# Patient Record
Sex: FEMALE | Race: WHITE | NOT HISPANIC OR LATINO
[De-identification: names, ages, dates, MRNs, and addresses within clinical notes are randomized per-mention and may not be internally consistent; named-entity substitution may affect disease eponyms.]

---

## 2020-12-05 ENCOUNTER — TRANSCRIPTION ENCOUNTER (OUTPATIENT)
Age: 65
End: 2020-12-05

## 2020-12-18 ENCOUNTER — TRANSCRIPTION ENCOUNTER (OUTPATIENT)
Age: 65
End: 2020-12-18

## 2021-06-18 ENCOUNTER — APPOINTMENT (OUTPATIENT)
Dept: NEUROLOGY | Facility: CLINIC | Age: 66
End: 2021-06-18
Payer: COMMERCIAL

## 2021-06-18 VITALS
HEIGHT: 60 IN | SYSTOLIC BLOOD PRESSURE: 124 MMHG | WEIGHT: 120 LBS | HEART RATE: 72 BPM | DIASTOLIC BLOOD PRESSURE: 80 MMHG | BODY MASS INDEX: 23.56 KG/M2 | TEMPERATURE: 97.2 F

## 2021-06-18 PROCEDURE — 99072 ADDL SUPL MATRL&STAF TM PHE: CPT

## 2021-06-18 PROCEDURE — 99203 OFFICE O/P NEW LOW 30 MIN: CPT

## 2021-06-18 RX ORDER — ASPIRIN 81 MG
81 TABLET, DELAYED RELEASE (ENTERIC COATED) ORAL
Refills: 0 | Status: ACTIVE | COMMUNITY

## 2021-06-21 NOTE — PHYSICAL EXAM
[___ / 5] : Visuospatial / Executive: [unfilled] / 5 [___ / 3] : Attention (Serial 7 subtraction): [unfilled] / 3 [___ / 1] : Fluency: [unfilled] / 1 [___ / 2] : Abstraction: [unfilled] / 2 [___ / 5] : Delayed Recall: [unfilled] / 5 [___ / 6] : Orientation: [unfilled] / 6 [FreeTextEntry1] : Physical examination \par General: No acute distress, Awake, Alert.   \par \par Mental status \par Awake, alert, gives detailed history. \par 6/18/21 MoCA 26/30, memory 1/5.\par \par Cranial Nerves \par II: VFF  \par III, IV, VI: PERRL, EOMI.   \par V: Facial sensation is normal B/L.   \par VII: Facial strength is normal B/L. \par \par \par VIII: Gross hearing is intact.   \par \par \par IX, X: Palate is midline and elevates symmetrically.   \par XI: Trapezius normal strength.   \par XII: Tongue midline without atrophy or fasciculations. \par \par Motor exam  \par Muscle tone - no evidence of rigidity or resistance in all 4 extremities.  \par No atrophy or fasciculations \par Muscle Strength: arms and legs, proximal and distal flexors and extensors are normal \par \par No UE drift.\par \par Reflexes \par All present, normal, and symmetrical.   \par \par Plantars right: mute.   \par Plantars left: mute.   \par \par  \par \par Coordination \par Finger to nose: Normal.  \par Heel to shin: Normal.   \par  \par \par Sensory \par Intact sensation to vibration and cold. \par \par \par Gait \par Normal including heels, toes, and tandem gait.  \par  \par  [[employer]] : 26

## 2021-06-21 NOTE — ASSESSMENT
[FreeTextEntry1] : Natalie Strauss is a 65 year old woman with memory impairment with no change in her daily function.\par \par Vitamin B12 level\par TSH\par MRI brain\par Neuropsychological evaluation. \par Follow up in 2 months.\par \par I discussed in detail with the patient and family the diagnosis, prognosis, treatment plan and answered all of their questions.\par \par  \par

## 2021-06-21 NOTE — CONSULT LETTER
[Dear  ___] : Dear  [unfilled], [FreeTextEntry1] : I had the pleasure of evaluating your patient, MADELIN MONTAGUE. Please see the assessment section below for a summary of my diagnostic impression and plan.\par \par Thank you very much for allowing me to participate in the care of this patient. If you have any questions, please do not hesitate to contact me. \par \par Sincerely,\par \par Caroline Pool MD\par Michelle Ny N.P.\par

## 2021-06-21 NOTE — HISTORY OF PRESENT ILLNESS
[FreeTextEntry1] : History obtained from patient and . \par \par Natalie Strauss is a 65 year old woman with a history of vasovagal syncope presenting for a consultation for memory loss.\par \par She endorses a slow, subtle, progressive memory loss over one year.  She is able to perform all of her ADL's without difficulty. Her  did not need to take over any tasks at home. Ms. Strauss endorses she is more forgetful when she is stressed. Her  notes that he will often have to repeat tasks to her (i.e. a delivery was made and she forgot that it occurred). She reports being able to recall after her  reminds her of what happened. \par \par She is aware of her memory loss. \par \par The remaining neurological review of systems is negative.

## 2021-06-24 ENCOUNTER — RESULT REVIEW (OUTPATIENT)
Age: 66
End: 2021-06-24

## 2021-06-25 ENCOUNTER — NON-APPOINTMENT (OUTPATIENT)
Age: 66
End: 2021-06-25

## 2021-06-25 LAB
TSH SERPL-ACNC: 1.65 UIU/ML
VIT B12 SERPL-MCNC: 977 PG/ML

## 2021-07-28 NOTE — PHYSICAL EXAM
[Well Developed] : well developed [Well Nourished] : well nourished [No Acute Distress] : no acute distress [Normal Conjunctiva] : normal conjunctiva [Normal Venous Pressure] : normal venous pressure [Normal S1, S2] : normal S1, S2 [No Carotid Bruit] : no carotid bruit [No Murmur] : no murmur [No Rub] : no rub [No Gallop] : no gallop [Clear Lung Fields] : clear lung fields [Good Air Entry] : good air entry [No Respiratory Distress] : no respiratory distress  [Soft] : abdomen soft [Non Tender] : non-tender [Normal Bowel Sounds] : normal bowel sounds [No Masses/organomegaly] : no masses/organomegaly [Normal Gait] : normal gait [No Edema] : no edema [No Cyanosis] : no cyanosis [No Clubbing] : no clubbing [No Varicosities] : no varicosities [No Rash] : no rash [No Skin Lesions] : no skin lesions [Moves all extremities] : moves all extremities [No Focal Deficits] : no focal deficits [Normal Speech] : normal speech [Alert and Oriented] : alert and oriented [Normal memory] : normal memory

## 2021-08-04 ENCOUNTER — NON-APPOINTMENT (OUTPATIENT)
Age: 66
End: 2021-08-04

## 2021-08-04 ENCOUNTER — APPOINTMENT (OUTPATIENT)
Dept: CARDIOLOGY | Facility: CLINIC | Age: 66
End: 2021-08-04
Payer: COMMERCIAL

## 2021-08-04 VITALS
HEIGHT: 60 IN | SYSTOLIC BLOOD PRESSURE: 112 MMHG | WEIGHT: 127 LBS | BODY MASS INDEX: 24.94 KG/M2 | DIASTOLIC BLOOD PRESSURE: 70 MMHG | TEMPERATURE: 98.6 F | HEART RATE: 84 BPM

## 2021-08-04 DIAGNOSIS — R55 SYNCOPE AND COLLAPSE: ICD-10-CM

## 2021-08-04 DIAGNOSIS — Z00.00 ENCOUNTER FOR GENERAL ADULT MEDICAL EXAMINATION W/OUT ABNORMAL FINDINGS: ICD-10-CM

## 2021-08-04 DIAGNOSIS — I95.1 ORTHOSTATIC HYPOTENSION: ICD-10-CM

## 2021-08-04 PROCEDURE — 93000 ELECTROCARDIOGRAM COMPLETE: CPT

## 2021-08-04 PROCEDURE — 99204 OFFICE O/P NEW MOD 45 MIN: CPT

## 2021-08-04 NOTE — HISTORY OF PRESENT ILLNESS
[FreeTextEntry1] : The patient is a 65 year-old woman referred because of recurrent syncope and near-syncope.\par \par She has had this history for many years, for at least 20 by her recollection. Her , who was present throughout, confirms this. Episodes are infrequent, but are very consistently associated with getting out of bed or sitting up from a chair. At these times, she feels a dizzy sensation and has an aura that she might faint. Over the years she has learned to live with this, quickly sitting down and avoiding syncope. However, in the past, if she did not sit down quickly, she lost consciousness. On only one occasion did she injure herself and required some sutures in her lip. Otherwise there is no history of trauma associated with these episodes.\par \par Her past medical history is otherwise unremarkable.\par There is no history of hypertension or diabetes.\par There is no history of heart murmur or rheumatic fever.\par She is a former one pack per day smoker who quit 10 years ago and was been vaping since that time.\par \par There are no cardiovascular symptoms.\par She denies chest pain or chest pressure. No shortness of breath or dyspnea on exertion.\par \par Additional information:\par She is currently seeing neurology and undergoing investigation for early-onset memory loss and cognitive dysfunction. She had a recent MRI of the brain which suggested microvascular changes.\par \par The patient is  and lives locally.\par She has adult children.\par \par EKG today reveals normal sinus rhythm and is within normal limits\par \par \par

## 2021-08-04 NOTE — ASSESSMENT
[FreeTextEntry1] : #1) long and many year history of recurrent dizziness including syncope. By history, this is clearly orthostatic although I could not demonstrate this today on exam. The history of standing up and becoming lightheaded and aborting many episodes by simply sitting or laying down is classic. She has never had an episode of syncope in which he did not follow a properly standing up from a sitting or supine position.\par --We had a lengthy discussion concerning the role of tilt table testing to confirm this. However, with her ongoing evaluation for memory loss and her reluctance to pursue this, we will defer tilt table testing. She expressed that she has lived with this for many years and is comfortable continuing to do so. She feels that if she sits down and avoid situations that she knows will precipitate dizziness, she can avoid these episodes. She is also reluctant to take any medications, and we confirm the diagnosis at tilt table testing, hence there appears to be little reason to proceed.\par #2) obvious cognitive dysfunction and memory loss, per neurology

## 2021-08-23 ENCOUNTER — APPOINTMENT (OUTPATIENT)
Dept: NEUROLOGY | Facility: CLINIC | Age: 66
End: 2021-08-23
Payer: COMMERCIAL

## 2021-08-23 VITALS
SYSTOLIC BLOOD PRESSURE: 113 MMHG | HEART RATE: 74 BPM | BODY MASS INDEX: 24.94 KG/M2 | DIASTOLIC BLOOD PRESSURE: 76 MMHG | TEMPERATURE: 97.7 F | WEIGHT: 127 LBS | HEIGHT: 60 IN

## 2021-08-23 DIAGNOSIS — R41.3 OTHER AMNESIA: ICD-10-CM

## 2021-08-23 PROCEDURE — 99213 OFFICE O/P EST LOW 20 MIN: CPT

## 2021-08-23 NOTE — ASSESSMENT
[FreeTextEntry1] : Natalie Strauss is a 65 year old woman with cognitive impairment with no change in her daily function.\par Some of the impairments may be related to attention.\par \par MRI Brain and blood tests reviewed. \par Neuropsychological evaluation -will consider prescribing medication after this has been completed. \par \par Follow up in 2 months.\par \par I discussed in detail with the patient and  the diagnosis, prognosis, treatment plan and answered all of their questions.\par \par

## 2021-08-23 NOTE — HISTORY OF PRESENT ILLNESS
[FreeTextEntry1] : No change in cognition.  No new c/o.\par Unable to find anyone to do a neuropsychological evaluation yet.

## 2021-12-07 ENCOUNTER — APPOINTMENT (OUTPATIENT)
Dept: NEUROLOGY | Facility: CLINIC | Age: 66
End: 2021-12-07

## 2024-03-01 ENCOUNTER — NON-APPOINTMENT (OUTPATIENT)
Age: 69
End: 2024-03-01

## 2024-03-05 ENCOUNTER — NON-APPOINTMENT (OUTPATIENT)
Age: 69
End: 2024-03-05

## 2024-03-05 ENCOUNTER — APPOINTMENT (OUTPATIENT)
Dept: THORACIC SURGERY | Facility: CLINIC | Age: 69
End: 2024-03-05
Payer: MEDICARE

## 2024-03-05 VITALS — WEIGHT: 122 LBS | BODY MASS INDEX: 23.95 KG/M2 | HEIGHT: 60 IN

## 2024-03-05 DIAGNOSIS — Z87.891 PERSONAL HISTORY OF NICOTINE DEPENDENCE: ICD-10-CM

## 2024-03-05 PROCEDURE — G0296 VISIT TO DETERM LDCT ELIG: CPT

## 2024-03-05 NOTE — PLAN
[Smoking Cessation] : smoking cessation [FreeTextEntry1] : Plan:  -Low dose CT chest for lung cancer screening. PETE SARAHDARLENE ordered the low dose CT.      -Follow up with  her referring provider after her LDCT results have been reviewed by the multidisciplinary clinical team, if needed.   -Encourage continued smoking abstinence.    Should I screen? tool utilized. 6 Year risk of lung cancer is  1.8 %.   Patient wishes to proceed with screening.  Engaged in discussion regarding risks of screening during Covid-19 pandemic and precautions that are being used  to reduce exposure.  Engaged in shared decision making with Ms. MONTAGUE . Answered all questions. She verbalized understanding and agreement. She knows to call back with and questions or concerns.

## 2024-03-05 NOTE — REASON FOR VISIT
[Home] : at home, [unfilled] , at the time of the visit. [Verbal consent obtained from patient] : the patient, [unfilled] [Other Location: e.g. Home (Enter Location, City,State)___] : at [unfilled] [Initial Evaluation] : an initial evaluation visit [Review of Eligibility] : review of eligibility [Low-Dose CT Screening Discussion] : low-dose CT lung cancer screening discussion

## 2024-03-05 NOTE — HISTORY OF PRESENT ILLNESS
[Former] : Former [YearQuit] : 2016 [TextBox_13] : Referred by Dr. Broderick   MADELIN MONTAGUE had telephonic visit for a review of eligibility and discussion of the Low dose CT lung cancer screening program. A telephonic visit occurred due to the patient not having access to a smart phone or a computer for an audio/visual visit. The following was reviewed and confirmed the patient meets screening eligibility criteria.  -Age 68 year Smoking Status: -Former smoker Started smoking at  19 - 22  years old. -Number of pack(s) per day:  PPD to 1 PPD -Number of years smoked: 41 -Number of pack years smokin -Number of years since quitting smokin -Quit year:    Ms. MONTAGUE reports nasal congestion and productive cough occasional green sputum for 2 weeks.  She denies any signs or symptoms of lung cancer including new cough, changing cough, hemoptysis, and unintentional weight loss.   Ms. MONTAGUE denies HX of lung disease, Covid infection, heart disease, connective tissue disease, and any personal history of cancer. Reports no lung cancer in a 1st degree relative. Reports no lung cancer in a 2nd degree relative. Denies any history of environmental and occupational exposures.  [PacksperYear] : 37

## 2025-01-15 ENCOUNTER — APPOINTMENT (OUTPATIENT)
Dept: PULMONOLOGY | Facility: CLINIC | Age: 70
End: 2025-01-15
Payer: MEDICARE

## 2025-01-15 VITALS
DIASTOLIC BLOOD PRESSURE: 84 MMHG | HEIGHT: 60 IN | OXYGEN SATURATION: 99 % | HEART RATE: 76 BPM | SYSTOLIC BLOOD PRESSURE: 118 MMHG | WEIGHT: 125 LBS | BODY MASS INDEX: 24.54 KG/M2

## 2025-01-15 DIAGNOSIS — R06.02 SHORTNESS OF BREATH: ICD-10-CM

## 2025-01-15 PROCEDURE — G0008: CPT

## 2025-01-15 PROCEDURE — 99204 OFFICE O/P NEW MOD 45 MIN: CPT

## 2025-01-15 PROCEDURE — 90662 IIV NO PRSV INCREASED AG IM: CPT

## 2025-03-11 ENCOUNTER — APPOINTMENT (OUTPATIENT)
Dept: THORACIC SURGERY | Facility: CLINIC | Age: 70
End: 2025-03-11
Payer: MEDICARE

## 2025-03-11 VITALS — BODY MASS INDEX: 24.74 KG/M2 | HEIGHT: 60 IN | WEIGHT: 126 LBS

## 2025-03-11 DIAGNOSIS — Z87.891 PERSONAL HISTORY OF NICOTINE DEPENDENCE: ICD-10-CM

## 2025-03-11 PROCEDURE — ACP01: CPT | Mod: NC

## 2025-03-18 ENCOUNTER — RESULT REVIEW (OUTPATIENT)
Age: 70
End: 2025-03-18

## 2025-03-24 ENCOUNTER — NON-APPOINTMENT (OUTPATIENT)
Age: 70
End: 2025-03-24

## 2025-07-09 ENCOUNTER — APPOINTMENT (OUTPATIENT)
Dept: PULMONOLOGY | Facility: CLINIC | Age: 70
End: 2025-07-09
Payer: MEDICARE

## 2025-07-09 VITALS
TEMPERATURE: 97.2 F | BODY MASS INDEX: 18.62 KG/M2 | DIASTOLIC BLOOD PRESSURE: 70 MMHG | SYSTOLIC BLOOD PRESSURE: 116 MMHG | HEART RATE: 79 BPM | HEIGHT: 71 IN | WEIGHT: 133 LBS | OXYGEN SATURATION: 96 %

## 2025-07-09 PROBLEM — R05.8 POST-VIRAL COUGH SYNDROME: Status: ACTIVE | Noted: 2025-07-09

## 2025-07-09 PROBLEM — J44.9 CHRONIC OBSTRUCTIVE PULMONARY DISEASE, UNSPECIFIED COPD TYPE: Status: ACTIVE | Noted: 2025-07-09

## 2025-07-09 PROCEDURE — 99213 OFFICE O/P EST LOW 20 MIN: CPT

## 2025-07-09 PROCEDURE — G2211 COMPLEX E/M VISIT ADD ON: CPT

## 2025-07-09 RX ORDER — ALBUTEROL SULFATE 90 UG/1
108 (90 BASE) INHALANT RESPIRATORY (INHALATION)
Qty: 1 | Refills: 5 | Status: ACTIVE | COMMUNITY
Start: 2025-07-09 | End: 1900-01-01

## 2025-07-09 RX ORDER — FLUTICASONE FUROATE AND VILANTEROL TRIFENATATE 200; 25 UG/1; UG/1
200-25 POWDER RESPIRATORY (INHALATION) DAILY
Qty: 1 | Refills: 5 | Status: ACTIVE | COMMUNITY
Start: 2025-07-09 | End: 1900-01-01

## 2025-07-09 RX ORDER — ATORVASTATIN CALCIUM 10 MG/1
10 TABLET, FILM COATED ORAL
Refills: 0 | Status: ACTIVE | COMMUNITY

## 2025-09-18 ENCOUNTER — TRANSCRIPTION ENCOUNTER (OUTPATIENT)
Age: 70
End: 2025-09-18